# Patient Record
Sex: MALE | URBAN - METROPOLITAN AREA
[De-identification: names, ages, dates, MRNs, and addresses within clinical notes are randomized per-mention and may not be internally consistent; named-entity substitution may affect disease eponyms.]

---

## 2023-02-17 ENCOUNTER — OFFICE (OUTPATIENT)
Dept: URBAN - METROPOLITAN AREA CLINIC 28 | Facility: CLINIC | Age: 28
Setting detail: OPHTHALMOLOGY
End: 2023-02-17
Payer: COMMERCIAL

## 2023-02-17 ENCOUNTER — RX ONLY (RX ONLY)
Age: 28
End: 2023-02-17

## 2023-02-17 DIAGNOSIS — H01.001: ICD-10-CM

## 2023-02-17 DIAGNOSIS — H01.005: ICD-10-CM

## 2023-02-17 DIAGNOSIS — H01.002: ICD-10-CM

## 2023-02-17 DIAGNOSIS — H01.004: ICD-10-CM

## 2023-02-17 PROCEDURE — 99203 OFFICE O/P NEW LOW 30 MIN: CPT | Performed by: OPHTHALMOLOGY

## 2023-02-17 ASSESSMENT — CONFRONTATIONAL VISUAL FIELD TEST (CVF)
OD_FINDINGS: FULL
OS_FINDINGS: FULL

## 2023-02-17 ASSESSMENT — LID EXAM ASSESSMENTS
OD_BLEPHARITIS: RLL RUL 2+
OS_BLEPHARITIS: LLL LUL 2+

## 2023-02-21 PROBLEM — H10.45 ALLERGIC CONJUNCTIVITIS: Status: ACTIVE | Noted: 2023-02-17

## 2023-02-21 ASSESSMENT — REFRACTION_AUTOREFRACTION
OD_SPHERE: -0.25
OS_AXIS: 011
OD_CYLINDER: -0.75
OS_CYLINDER: -0.75
OS_SPHERE: -0.25
OD_AXIS: 173

## 2023-02-21 ASSESSMENT — KERATOMETRY
OS_AXISANGLE_DEGREES: 096
OS_K2POWER_DIOPTERS: 45.25
OS_K1POWER_DIOPTERS: 44.00
OD_AXISANGLE_DEGREES: 090
OD_K1POWER_DIOPTERS: 43.75
OD_K2POWER_DIOPTERS: 44.75

## 2023-02-21 ASSESSMENT — SPHEQUIV_DERIVED
OS_SPHEQUIV: -0.625
OD_SPHEQUIV: -0.625

## 2023-02-21 ASSESSMENT — AXIALLENGTH_DERIVED
OS_AL: 23.4234
OD_AL: 23.56

## 2023-02-21 ASSESSMENT — VISUAL ACUITY
OD_BCVA: 20/20-1
OS_BCVA: 20/20-2

## 2024-05-21 ENCOUNTER — EMERGENCY (EMERGENCY)
Facility: HOSPITAL | Age: 29
LOS: 1 days | Discharge: HOME | End: 2024-05-21
Attending: STUDENT IN AN ORGANIZED HEALTH CARE EDUCATION/TRAINING PROGRAM | Admitting: STUDENT IN AN ORGANIZED HEALTH CARE EDUCATION/TRAINING PROGRAM
Payer: COMMERCIAL

## 2024-05-21 VITALS
DIASTOLIC BLOOD PRESSURE: 83 MMHG | OXYGEN SATURATION: 97 % | HEIGHT: 71 IN | RESPIRATION RATE: 18 BRPM | WEIGHT: 242.07 LBS | SYSTOLIC BLOOD PRESSURE: 137 MMHG | TEMPERATURE: 98 F | HEART RATE: 83 BPM

## 2024-05-21 DIAGNOSIS — R19.00 INTRA-ABDOMINAL AND PELVIC SWELLING, MASS AND LUMP, UNSPECIFIED SITE: ICD-10-CM

## 2024-05-21 LAB
ALBUMIN SERPL ELPH-MCNC: 4.8 G/DL — SIGNIFICANT CHANGE UP (ref 3.3–5)
ALP SERPL-CCNC: 58 U/L — SIGNIFICANT CHANGE UP (ref 40–120)
ALT FLD-CCNC: 21 U/L — SIGNIFICANT CHANGE UP (ref 10–45)
ANION GAP SERPL CALC-SCNC: 9 MMOL/L — SIGNIFICANT CHANGE UP (ref 5–17)
AST SERPL-CCNC: 21 U/L — SIGNIFICANT CHANGE UP (ref 10–40)
BASOPHILS # BLD AUTO: 0.03 K/UL — SIGNIFICANT CHANGE UP (ref 0–0.2)
BASOPHILS NFR BLD AUTO: 0.5 % — SIGNIFICANT CHANGE UP (ref 0–2)
BILIRUB DIRECT SERPL-MCNC: <0.2 MG/DL — SIGNIFICANT CHANGE UP (ref 0–0.3)
BILIRUB INDIRECT FLD-MCNC: SIGNIFICANT CHANGE UP (ref 0.2–1)
BILIRUB SERPL-MCNC: 0.9 MG/DL — SIGNIFICANT CHANGE UP (ref 0.2–1.2)
BUN SERPL-MCNC: 16 MG/DL — SIGNIFICANT CHANGE UP (ref 7–23)
CALCIUM SERPL-MCNC: 9.8 MG/DL — SIGNIFICANT CHANGE UP (ref 8.4–10.5)
CHLORIDE SERPL-SCNC: 106 MMOL/L — SIGNIFICANT CHANGE UP (ref 96–108)
CO2 SERPL-SCNC: 26 MMOL/L — SIGNIFICANT CHANGE UP (ref 22–31)
CREAT SERPL-MCNC: 1.01 MG/DL — SIGNIFICANT CHANGE UP (ref 0.5–1.3)
EGFR: 103 ML/MIN/1.73M2 — SIGNIFICANT CHANGE UP
EOSINOPHIL # BLD AUTO: 0.05 K/UL — SIGNIFICANT CHANGE UP (ref 0–0.5)
EOSINOPHIL NFR BLD AUTO: 0.9 % — SIGNIFICANT CHANGE UP (ref 0–6)
GLUCOSE SERPL-MCNC: 101 MG/DL — HIGH (ref 70–99)
HCT VFR BLD CALC: 43.7 % — SIGNIFICANT CHANGE UP (ref 39–50)
HGB BLD-MCNC: 15.4 G/DL — SIGNIFICANT CHANGE UP (ref 13–17)
IMM GRANULOCYTES NFR BLD AUTO: 0.2 % — SIGNIFICANT CHANGE UP (ref 0–0.9)
LIDOCAIN IGE QN: 16 U/L — SIGNIFICANT CHANGE UP (ref 7–60)
LYMPHOCYTES # BLD AUTO: 1.58 K/UL — SIGNIFICANT CHANGE UP (ref 1–3.3)
LYMPHOCYTES # BLD AUTO: 28.6 % — SIGNIFICANT CHANGE UP (ref 13–44)
MCHC RBC-ENTMCNC: 32.4 PG — SIGNIFICANT CHANGE UP (ref 27–34)
MCHC RBC-ENTMCNC: 35.2 GM/DL — SIGNIFICANT CHANGE UP (ref 32–36)
MCV RBC AUTO: 92 FL — SIGNIFICANT CHANGE UP (ref 80–100)
MONOCYTES # BLD AUTO: 0.35 K/UL — SIGNIFICANT CHANGE UP (ref 0–0.9)
MONOCYTES NFR BLD AUTO: 6.3 % — SIGNIFICANT CHANGE UP (ref 2–14)
NEUTROPHILS # BLD AUTO: 3.51 K/UL — SIGNIFICANT CHANGE UP (ref 1.8–7.4)
NEUTROPHILS NFR BLD AUTO: 63.5 % — SIGNIFICANT CHANGE UP (ref 43–77)
NRBC # BLD: 0 /100 WBCS — SIGNIFICANT CHANGE UP (ref 0–0)
PLATELET # BLD AUTO: 210 K/UL — SIGNIFICANT CHANGE UP (ref 150–400)
POTASSIUM SERPL-MCNC: 4.4 MMOL/L — SIGNIFICANT CHANGE UP (ref 3.5–5.3)
POTASSIUM SERPL-SCNC: 4.4 MMOL/L — SIGNIFICANT CHANGE UP (ref 3.5–5.3)
PROT SERPL-MCNC: 7.8 G/DL — SIGNIFICANT CHANGE UP (ref 6–8.3)
RBC # BLD: 4.75 M/UL — SIGNIFICANT CHANGE UP (ref 4.2–5.8)
RBC # FLD: 11.9 % — SIGNIFICANT CHANGE UP (ref 10.3–14.5)
SODIUM SERPL-SCNC: 141 MMOL/L — SIGNIFICANT CHANGE UP (ref 135–145)
WBC # BLD: 5.53 K/UL — SIGNIFICANT CHANGE UP (ref 3.8–10.5)
WBC # FLD AUTO: 5.53 K/UL — SIGNIFICANT CHANGE UP (ref 3.8–10.5)

## 2024-05-21 PROCEDURE — 80048 BASIC METABOLIC PNL TOTAL CA: CPT

## 2024-05-21 PROCEDURE — 74177 CT ABD & PELVIS W/CONTRAST: CPT | Mod: MC

## 2024-05-21 PROCEDURE — 85025 COMPLETE CBC W/AUTO DIFF WBC: CPT

## 2024-05-21 PROCEDURE — 99284 EMERGENCY DEPT VISIT MOD MDM: CPT | Mod: 25

## 2024-05-21 PROCEDURE — 36415 COLL VENOUS BLD VENIPUNCTURE: CPT

## 2024-05-21 PROCEDURE — 83690 ASSAY OF LIPASE: CPT

## 2024-05-21 PROCEDURE — 99285 EMERGENCY DEPT VISIT HI MDM: CPT

## 2024-05-21 PROCEDURE — 74177 CT ABD & PELVIS W/CONTRAST: CPT | Mod: 26,MC

## 2024-05-21 PROCEDURE — 80076 HEPATIC FUNCTION PANEL: CPT

## 2024-05-21 NOTE — ED PROVIDER NOTE - PATIENT PORTAL LINK FT
You can access the FollowMyHealth Patient Portal offered by [facility] by registering at the following website: [email or web address]. By joining [employer]’s FollowMyHealth portal, you will also be able to view your health information using other applications (apps) compatible with our system.

## 2024-05-21 NOTE — ED PROVIDER NOTE - PRINCIPAL DIAGNOSIS
Clinic Care Coordination Contact  Care Team Conversations    SW contacted patient with an  to inform him of phone appointment on 3/22.     Abdominal complaints

## 2024-05-21 NOTE — ED PROVIDER NOTE - NSFOLLOWUPINSTRUCTIONS_ED_ALL_ED_FT
Please return for worsening symptoms, worsening pain, vomiting. Please take tylenol or motrin if you have any discomfort. Please follow up with your primary physician.

## 2024-05-21 NOTE — ED ADULT NURSE NOTE - IN ACCORDANCE WITH [place] LAW, WE OFFER EVERY PATIENT A HEPATITIS C TEST. WOULD YOU LIKE TO BE TESTED TODAY?
"PSYCHIATRY  PROGRESS NOTE     DATE OF SERVICE   03/31/2022       CHIEF COMPLAINT   \"I am all right\"       SUBJECTIVE   Nursing reports: Patient remains isolative and withdrawn. He comes out for meals only and doesn't interacts with his peers.     Patient has been discussed with the  during team meeting.  SW has continue to follow up on the guardianship process, we have not received any updates.      OBJECTIVE   Today patient was seen and evaluated at bedside by himself, this was a face-to-face evaluation.  Patient reported that he is doing \"alright\". Continues to deny all psychiatric symptoms and has been shayan for safety. He continues to ask about his discharge and believes that he is leaving today.      MEDICATIONS   Medications:  Scheduled Meds:    aspirin  81 mg Oral Daily     [Held by provider] atorvastatin  80 mg Oral At Bedtime     busPIRone HCl  30 mg Oral BID     cloZAPine  200 mg Oral At Bedtime     donepezil  10 mg Oral At Bedtime     gabapentin  100 mg Oral TID w/meals     haloperidol  1 mg Oral At Bedtime     levothyroxine  88 mcg Oral Daily     megestrol  20 mg Oral Daily     melatonin  5 mg Oral At Bedtime     memantine  10 mg Oral BID     mirtazapine  15 mg Oral At Bedtime     salmeterol  1 puff Inhalation BID     Continuous Infusions:  PRN Meds:.[Held by provider] acetaminophen, albuterol, alum & mag hydroxide-simethicone, benzocaine-menthol, hydrOXYzine, nicotine, polyethylene glycol, polyethylene glycol-propylene glycol PF, risperiDONE, senna-docusate, [Held by provider] traZODone    Medication adherence issues: MS Med Adherence Y/N: Yes, Hospitalization  Medication side effects: MEDICATION SIDE EFFECTS: no side effects reported  Benefit: Yes / No: Yes       ROS   A comprehensive review of systems was negative.       MENTAL STATUS EXAM   Vitals: /79 (BP Location: Right arm, Patient Position: Supine)   Pulse 87   Temp 96.8  F (36  C) (Temporal)   Resp 16   Ht 1.778 m " "(5' 10\")   Wt 76.2 kg (168 lb 1.6 oz)   SpO2 100%   BMI 24.12 kg/m      Same as last visit     Appearance:  No apparent distress  Mood: \"I am all right\"  Affect: Calm and pleasant  Suicidal Ideation: denies   Homicidal Ideation: denies   Thought process: concrete  Thought content: confused and responding to internal stimuli  Fund of Knowledge: Below average  Attention/Concentration: Easily distracted  Language ability: Significantly impaired  Memory: Global memory impairment  Insight:  Poor.  Judgement: Poor  Orientation: Only to person  Psychomotor Behavior: slowed    Muscle Strength and Tone: MuscleStrength: Normal and Atrophy  Gait and Station: Not evaluated       LABS   personally reviewed.     No results found for: PHENYTOIN, PHENOBARB, VALPROATE, CBMZ       DIAGNOSIS   Principal Problem:    Major neurocognitive disorder, due to multiple etiologies, with behavioral disturbance, severe (H)    Active Problem List:  Patient Active Problem List   Diagnosis     TBI with aggressive behavior     HTN (hypertension)     COPD (chronic obstructive pulmonary disease) (H)     Dementia with behavioral disturbance (H)     Chronic schizophrenia (H)     Smoker     Agitation     Behavior disturbance     Psychosis (H)     Major neurocognitive disorder, due to multiple etiologies, with behavioral disturbance, severe (H)     Paranoid schizophrenia, chronic condition with acute exacerbation (H)          PLAN   1. Ongoing education given regarding diagnostic and treatment options with risks, benefits and alternatives and adequate verbalization of understanding.  2.  Medications       BuSpar 30 mg 2 times daily       Clozaril 200 mg at bedtime       Aricept 10 mg at bedtime       Gabapentin 100 mg 3 times a day       Haldol 1 mg at bedtime       Namenda 10 mg 2 times a day       Remeron 15 mg at bedtime  3.  Medical team following the patient as needed   4.   coordinating a safe discharge plan with the patient.    Risk " Assessment: [facility] RISK ASSESSMENT: Patient able to contract for safety    Coordination of Care:   Treatment Plan reviewed and physician signed, Care discussed with Care/Treatment Team Members, Chart reviewed and Patient seen      Re-Certification I certify that the inpatient psychiatric facility services furnished since the previous certification were, and continue to be, medically necessary for, either, treatment which could reasonably be expected to improve the patient s condition or diagnostic study and that the hospital records indicate that the services furnished were, either, intensive treatment services, admission and related services necessary for diagnostic study, or equivalent services.     I certify that the patient continues to need, on a daily basis, active treatment furnished directly by or requiring the supervision of inpatient psychiatric facility personnel.   I estimate 14 days of hospitalization is necessary for proper treatment of the patient. My plans for post-hospital care for this patient are  TBD     Ginger Dubon MD    -     03/31/2022  -     11:30 AM    Total time 15 minutes with > 50%spent on coordination of cares and psycho-education.    This note was created with help of Dragon dictation system. Grammatical / typing errors are not intentional.    Ginger Dubon MD      Opt out

## 2024-05-21 NOTE — ED ADULT NURSE NOTE - NSFALLUNIVINTERV_ED_ALL_ED
Bed/Stretcher in lowest position, wheels locked, appropriate side rails in place/Call bell, personal items and telephone in reach/Instruct patient to call for assistance before getting out of bed/chair/stretcher/Non-slip footwear applied when patient is off stretcher/[place] to call system/Physically safe environment - no spills, clutter or unnecessary equipment/Purposeful proactive rounding/Room/bathroom lighting operational, light cord in reach

## 2024-05-21 NOTE — ED PROVIDER NOTE - PHYSICAL EXAMINATION
general: Well appearing, in no acute distress  HEENT: Normocephalic, atraumatic, extraocular movements intact  CV: Regular rate  Pulm: No respiratory distress, no tachypnea  Abd:  soft, nontender, no rebound or guarding  Ext: warm and well perfused  Skin: No gross rashes or lesions  Neuro: Alert and oriented, moving all extremities

## 2024-05-21 NOTE — ED ADULT NURSE NOTE - OBJECTIVE STATEMENT
Diffuse abdominal pain with mildly increasing distention x 1 year. Denies N/V/D. Denies fevers or chills. Alert, oriented, and ambulatory at time of assessment. Denies acute changes within the last 24 hours. Denies chest pain or SOB. Denies dizziness or weakness.

## 2024-05-21 NOTE — ED ADULT NURSE NOTE - NSFALLRISKASMTTYPE_ED_ALL_ED
Initial (On Arrival)
Medical Orders for Life-Sustaining Treatment (MOLST)/Health Care Proxy (HCP)/Do Not Resuscitate (DNR)

## 2024-05-21 NOTE — ED ADULT NURSE NOTE - NS ED NURSE IV DC DT
Attempted to contact parent regarding positive result  LVM with return contact info Mother notified of +covid result. Denies any questions at this time. States pt is feeling better. Educated on quarantine and return to PED criteria.  Statement Selected Yes 21-May-2024 16:14

## 2024-05-21 NOTE — ED PROVIDER NOTE - OBJECTIVE STATEMENT
28-year-old male no significant past medical history presenting with abdominal protrusion over the last 2 years with intermittent pain.  No fever or chills, no nausea or vomiting, no urinary complaints.  Patient endorsing significant weight gain over this time.  No other acute complaints.  ROS as above.

## 2024-05-21 NOTE — ED PROVIDER NOTE - CLINICAL SUMMARY MEDICAL DECISION MAKING FREE TEXT BOX
28-year-old male presenting with abdominal complaints, well-appearing, soft abdomen with mild protrusion, will obtain CT for rule out hernia, labs, reassess.